# Patient Record
Sex: MALE | Race: NATIVE HAWAIIAN OR OTHER PACIFIC ISLANDER
[De-identification: names, ages, dates, MRNs, and addresses within clinical notes are randomized per-mention and may not be internally consistent; named-entity substitution may affect disease eponyms.]

---

## 2020-10-01 ENCOUNTER — HOSPITAL ENCOUNTER (EMERGENCY)
Dept: HOSPITAL 5 - ED | Age: 35
Discharge: HOME | End: 2020-10-01
Payer: SELF-PAY

## 2020-10-01 VITALS — SYSTOLIC BLOOD PRESSURE: 148 MMHG | DIASTOLIC BLOOD PRESSURE: 70 MMHG
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
## 2020-10-01 DIAGNOSIS — I10: ICD-10-CM

## 2020-10-01 DIAGNOSIS — Z79.899: ICD-10-CM

## 2020-10-01 DIAGNOSIS — S62.101A: Primary | ICD-10-CM
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
## 2020-10-01 PROCEDURE — 99283 EMERGENCY DEPT VISIT LOW MDM: CPT

## 2020-10-01 NOTE — EMERGENCY DEPARTMENT REPORT
Upper Extremity





- [facility]


Chief Complaint: Extremity Injury, Upper


Stated Complaint: RT HAND INJURY


Time Seen by Provider: 10/01/20 13:57


Upper Extremity: Right Wrist


Occurred When: Today


Severity: mild, moderate


Symptoms: Yes Pain with Movement, Yes Limited Range of Movement, Yes Swelling, 

Yes Bruising/Ecchymosis, No Deformity, No Numbness, No Weakness, No Laceration 

or Abrasion





ED Review of Systems


ROS: 


Stated complaint: RT HAND INJURY


Other details as noted in HPI








ED Past Medical Hx





- Past Medical History


Hx Hypertension: Yes





- Surgical History


Past Surgical History?: No





- Social History


Smoking Status: Never Smoker


Substance Use Type: Alcohol





- Medications


Home Medications: 


                                Home Medications











 Medication  Instructions  Recorded  Confirmed  Last Taken  Type


 


Acetaminophen/Codeine [Tylenol 1 tab PO Q6H PRN #14 tab 10/01/20  Unknown Rx





/Codeine # 3 tab]     














Upper Extremity Exam





- Exam


General: 


Vital signs noted. No distress. Alert and acting appropriately.








ED Course


                                   Vital Signs











  10/01/20 10/01/20





  12:36 15:30


 


Temperature 98.6 F 


 


Pulse Rate 111 H 


 


Respiratory 18 18





Rate  


 


Blood Pressure 194/124 


 


O2 Sat by Pulse 98 





Oximetry  











Critical care attestation.: 


If time is entered above; I have spent that time in minutes in the direct care 

of this critically ill patient, excluding procedure time.








ED Disposition


Clinical Impression: 


 Wrist fracture, right





Disposition: DC-01 TO HOME OR SELFCARE


Is pt being admited?: No


Does the pt Need Aspirin: No


Condition: Stable


Instructions:  Wrist Fracture in Adults (ED)


Prescriptions: 


Acetaminophen/Codeine [Tylenol /Codeine # 3 tab] 1 tab PO Q6H PRN #14 tab


 PRN Reason: Pain , Severe (7-10)


Referrals: 


JOSE MARIA RIOS MD [Primary Care Provider] - 3-5 Days


[facility] [Provider Group] - 3-5 Days


MAYRA MA MD [Staff Physician] - 3-5 Days

## 2020-10-01 NOTE — XRAY REPORT
RIGHT WRIST 3 VIEWS 1307



INDICATION: crush injury, pain



COMPARISON: None available.



FINDINGS: Ulnar styloid is  which appears old. No dislocation is seen. Lucency is seen in th
e distal carpal row ventrally in the area of the trapezium on the lateral projection only. Possibly t
his is artifact but I cannot exclude a nondisplaced fracture in this area. Clinical attention is lamberto joshua.







Signer Name: Bryant Perales MD 

Signed: 10/1/2020 1:33 PM

Workstation Name: [id number]